# Patient Record
Sex: MALE | Race: WHITE | NOT HISPANIC OR LATINO
[De-identification: names, ages, dates, MRNs, and addresses within clinical notes are randomized per-mention and may not be internally consistent; named-entity substitution may affect disease eponyms.]

---

## 2018-01-02 ENCOUNTER — TRANSCRIPTION ENCOUNTER (OUTPATIENT)
Age: 53
End: 2018-01-02

## 2018-01-02 ENCOUNTER — APPOINTMENT (OUTPATIENT)
Dept: CT IMAGING | Facility: CLINIC | Age: 53
End: 2018-01-02

## 2018-01-02 PROBLEM — Z00.00 ENCOUNTER FOR PREVENTIVE HEALTH EXAMINATION: Status: ACTIVE | Noted: 2018-01-02

## 2018-03-09 ENCOUNTER — TRANSCRIPTION ENCOUNTER (OUTPATIENT)
Age: 53
End: 2018-03-09

## 2020-12-05 ENCOUNTER — TRANSCRIPTION ENCOUNTER (OUTPATIENT)
Age: 55
End: 2020-12-05

## 2021-06-07 ENCOUNTER — TRANSCRIPTION ENCOUNTER (OUTPATIENT)
Age: 56
End: 2021-06-07

## 2024-04-04 ENCOUNTER — APPOINTMENT (OUTPATIENT)
Dept: ORTHOPEDIC SURGERY | Facility: CLINIC | Age: 59
End: 2024-04-04
Payer: COMMERCIAL

## 2024-04-04 VITALS — WEIGHT: 220 LBS | HEIGHT: 72 IN | BODY MASS INDEX: 29.8 KG/M2

## 2024-04-04 DIAGNOSIS — M43.17 SPONDYLOLISTHESIS, LUMBOSACRAL REGION: ICD-10-CM

## 2024-04-04 DIAGNOSIS — M47.816 SPONDYLOSIS W/OUT MYELOPATHY OR RADICULOPATHY, LUMBAR REGION: ICD-10-CM

## 2024-04-04 DIAGNOSIS — Z78.9 OTHER SPECIFIED HEALTH STATUS: ICD-10-CM

## 2024-04-04 DIAGNOSIS — M51.36 OTHER INTERVERTEBRAL DISC DEGENERATION, LUMBAR REGION: ICD-10-CM

## 2024-04-04 DIAGNOSIS — M47.812 SPONDYLOSIS W/OUT MYELOPATHY OR RADICULOPATHY, CERVICAL REGION: ICD-10-CM

## 2024-04-04 PROCEDURE — 99203 OFFICE O/P NEW LOW 30 MIN: CPT

## 2024-04-04 PROCEDURE — 72100 X-RAY EXAM L-S SPINE 2/3 VWS: CPT

## 2024-04-04 NOTE — HISTORY OF PRESENT ILLNESS
[Lower back] : lower back [1] : 2 [Dull/Aching] : dull/aching [Constant] : constant [Meds] : meds [Sitting] : sitting [de-identified] : 04/04/24:  Return visit for a 58 year old male c/o recurring LBP. No leg pain. Worse sitting and driving more than 45 minutes. has been doing a HEP and acupuncture. Takes aleve prn. Has not responded to MDP in the past.  08/26/21: Returns today for continued lower back pain.Pain is a "3-4" No leg pain. In PT 2x/week which has helped, and doing acupuncture. Needs a new PT script. Taking Skelaxin. Low to moderate pain scale. Is limping.  07/26/21: Returns today with recurrent lower back pain x last 1 months duration. Got worse x 1 week ago after breaking up a dog fight. Pain was a "8-9". Started Skelaxin, Naprosyn and Tylenol w/ codeine x 1 dose which helped. pain is now a "3-4". COntinues doing yoga,acupuncture, and using Skelaxin prn.  4/26/21 Still c/o intermittent LBP. Gets acupuncture 1-2x/week which helps. Taking Skelaxin as well.Doing yoga weekly which helps.  7/16/20 Still c/o intermittent LBP. Doing acupuncture 1x/week and swimming at home which helps.His modified work schedule has helped. taking Naprosyn prn.  Also, woke up x 1 week go w/ pain redness and swelling lt big ote and foot. Really tender over big toe. Has been limping.No hx of gout. Has a family hx where grandmother was positive for gout. NO hx of trauma.  5/18/20 Still c/o intermittent LBP. Has occ. lt anterior thigh pain radiating towards lt knee. Takes nsaids prn which helps. Doing HEP. has continued with limited traveling program which has helped.  3/13/20 Still c/o intermittent LBP. Still doing acupuncture which helps. has been able to defer LESI so far. Doing HEP which helps.  01/07/20: Returns today for his lower back and is still in pain. Is working a modified duty. Started acupuncture and feels that this helps and sees a chiropractor. Having difficulty with his commute on the train which exacerbates back pain. Has also seen Dr. Corona in pain management and is considering LESI.  09/09/19: Returns today for recurrent lower back pain. Difficulty sitting prolonged at work and working extended hours for call. Needs to stand while working. Reports MDP does not help. Unable to work out/exercise. Improves with rest from steady workload.  4/11/19 Return visit for this 54 yo male works out at the gym regularly and reinjured his lower back x last 6-8 weeks duration. Localized to lt lower back. Worse arising from a sitting position and sitting.Pain is a "3-4".Toiok aleve 1 pill prn. PMH: Had similar LBP in the past. Tried spinal decompression and MDP which helped.  8/12/16 Returns today feeling 70% better after doing DVX traction w/ chiro. Wearing LSO. Finished MDP x 1. Here for MRI L-spine results.  8/2/16: Initial visit for this 51 y/o male c/o lower back pain x years, worse since last week after running on the treadmill which was localized to lower back and into peripheral buttocks. He has been taking aleve x past week without relief. Pain worse with sneezing, getting up, lying supine. No numbness, tingling. pain is a "3-7". No wake up pain at night. [] : no [FreeTextEntry5] : no new injury [FreeTextEntry9] : acupuncture [de-identified] : acupuncutre, massage

## 2024-04-04 NOTE — PHYSICAL EXAM
[Normal Mood and Affect] : normal mood and affect [Able to Communicate] : able to communicate [Well Developed] : well developed [Well Nourished] : well nourished [] : no SI joint tenderness [Disc space narrowing] : Disc space narrowing [Spondylolithesis] : Spondylolithesis [FreeTextEntry1] : Moderate DDD at L5-S1 and Grade 2 degen spondylo at L5-S1 unchanged from xrays in 4/19.

## 2024-04-04 NOTE — PLAN
[TextEntry] : We discussed at length with the patient the options for treatment.  We discussed conservative care including physical therapy, acupuncture, massage therapy and chiropractic care.  We discussed injection therapy and even surgical intervention should the patient fail conservative care.  We discussed, risks, benefits, complications, alternatives, outcomes and expectations.   All questions answered.  Continue aleve otc  Patient is being referred for physical therapy for various modalities.  If no improvement, consider MRI.  Acupuncture prn.